# Patient Record
Sex: FEMALE | Race: WHITE | NOT HISPANIC OR LATINO | URBAN - METROPOLITAN AREA
[De-identification: names, ages, dates, MRNs, and addresses within clinical notes are randomized per-mention and may not be internally consistent; named-entity substitution may affect disease eponyms.]

---

## 2020-09-08 NOTE — PATIENT DISCUSSION
Surgery Counseling: I have discussed the option of scheduling surgery versus following, as well as the risks, benefits and alternatives of cataract surgery with the patient. It was explained that the surgery is medically indicated at this time, and it can be performed at the patient's option as delaying will cause no further deterioration, therefore there is no rush and there is no harm in waiting to have surgery. It was also explained that there is no guarantee that removing the cataract will improve their vision. The patient understands and desires to proceed with cataract surgery with the implantation of an intraocular lens to improve vision for [facility] AND [facility] AND DRIVING______. I have given the patient the prescribed regimen of the all-in-one drop to use before and after cataract surgery. They have elected to use the all-in-one option of Pred/Gati/Brom(prednisolone acetate,gatifloxacin,and bromfenac. Patient to administer as directed.

## 2020-09-08 NOTE — PATIENT DISCUSSION
CATARACTS, OU - VISUALLY SIGNIFICANT. SCHEDULE os__ FIRST THEN LATER IN _od_ DISCUSSED OPTION OF _STANDARD OS___VS _STANDARD WITH LENSX OD___. PATIENT UNDERSTANDS AND DESIRES ____STANDARD FOR OS AND WISHES TO THINK ABOUT OPTIONS FOR RIGHT EYE_.

## 2020-10-20 NOTE — PATIENT DISCUSSION
S/P PE IOL, _OS__. DOING WELL. CONTINUE PRED-GATI-BROM IN THE SURGICAL EYE  FOR A TOTAL OF 3 WEEKS USE THEN DISCONTINUE. PATIENT DESIRES __STANDARD_____IOL FOR 2ND EYE. SCHEDULE CATARACT SURGERY.

## 2020-10-20 NOTE — PATIENT DISCUSSION
Pre-Op 2nd Eye Counseling: The patient has noticed an improvement in their visual symptoms in the operative eye. The patient complains of decreased vision in the fellow eye when _____READING AND DRIVING______. It was explained to the patient that the decision to proceed with cataract surgery in the fellow eye is entirely a separate decision from the surgical eye. All of the same risks, benefits and alternatives are reviewed with the patient again. The patient does feel the vision in the non-operative eye is limiting their daily activities and elects to proceed with cataract surgery in the __RIGHT_____ eye. . I have given the patient the prescribed regimen of  drops to use before and after cataract surgery. Patient to administer as directed.

## 2020-11-09 NOTE — PATIENT DISCUSSION
Post-Op Instructions: The patient was instructed to continue the proper use of post-operative eye drop: omni in the surgical eye, 3 times per day for the remainder of the 3 weeks schedule, then discontinue. Call back instructions, retinal detachment and endophthalmitis precautions given.

## 2022-04-19 ENCOUNTER — ESTABLISHED PATIENT (OUTPATIENT)
Dept: URBAN - METROPOLITAN AREA CLINIC 7 | Facility: CLINIC | Age: 82
End: 2022-04-19

## 2022-04-19 DIAGNOSIS — H25.13: ICD-10-CM

## 2022-04-19 DIAGNOSIS — H47.233: ICD-10-CM

## 2022-04-19 PROCEDURE — 92014 COMPRE OPH EXAM EST PT 1/>: CPT

## 2022-04-19 PROCEDURE — 92250 FUNDUS PHOTOGRAPHY W/I&R: CPT

## 2022-04-19 PROCEDURE — 92015 DETERMINE REFRACTIVE STATE: CPT

## 2022-04-19 ASSESSMENT — KERATOMETRY
OD_K1POWER_DIOPTERS: 42.50
OD_AXISANGLE_DEGREES: 155
OS_K2POWER_DIOPTERS: 43.00
OD_K2POWER_DIOPTERS: 42.75
OD_AXISANGLE2_DEGREES: 65
OS_AXISANGLE_DEGREES: 108
OS_AXISANGLE2_DEGREES: 18
OS_K1POWER_DIOPTERS: 42.50

## 2022-04-19 ASSESSMENT — TONOMETRY
OD_IOP_MMHG: 17
OS_IOP_MMHG: 17

## 2022-04-19 ASSESSMENT — VISUAL ACUITY
OS_SC: 20/150
OU_SC: 20/50
OD_SC: 20/250

## 2022-05-02 ENCOUNTER — ESTABLISHED PATIENT (OUTPATIENT)
Dept: URBAN - METROPOLITAN AREA CLINIC 14 | Facility: CLINIC | Age: 82
End: 2022-05-02

## 2022-05-02 DIAGNOSIS — H25.13: ICD-10-CM

## 2022-05-02 PROCEDURE — 92136 OPHTHALMIC BIOMETRY: CPT

## 2022-05-02 PROCEDURE — 99214 OFFICE O/P EST MOD 30 MIN: CPT

## 2022-05-02 ASSESSMENT — VISUAL ACUITY
OS_PH: 20/50
OD_CC: 20/50
OS_CC: 20/50
OS_SC: 20/400
OD_SC: 20/200
OD_PH: 20/50
OS_SC: J10
OD_SC: J18
OD_CC: J10
OD_BCVA: 20/50
OS_BCVA: 20/50
OS_CC: J5

## 2022-05-02 ASSESSMENT — KERATOMETRY
OS_AXISANGLE_DEGREES: 96
OD_K1POWER_DIOPTERS: 42.25
OD_K2POWER_DIOPTERS: 42.50
OS_K2POWER_DIOPTERS: 42.75
OS_K1POWER_DIOPTERS: 42.25
OS_AXISANGLE2_DEGREES: 6
OD_AXISANGLE2_DEGREES: 36
OD_AXISANGLE_DEGREES: 126

## 2022-05-02 ASSESSMENT — TONOMETRY
OS_IOP_MMHG: 16
OD_IOP_MMHG: 13

## 2022-05-05 ENCOUNTER — CLINICAL TRIAL VISIT (OUTPATIENT)
Dept: URBAN - METROPOLITAN AREA CLINIC 14 | Facility: CLINIC | Age: 82
End: 2022-05-05

## 2022-05-05 DIAGNOSIS — H25.13: ICD-10-CM

## 2022-05-05 PROCEDURE — 99199CT CLINICAL TRIAL VISIT

## 2022-05-18 ENCOUNTER — POST OP/EVAL OF SECOND EYE (OUTPATIENT)
Dept: URBAN - METROPOLITAN AREA CLINIC 14 | Facility: CLINIC | Age: 82
End: 2022-05-18

## 2022-05-18 DIAGNOSIS — H25.11: ICD-10-CM

## 2022-05-18 DIAGNOSIS — H52.03: ICD-10-CM

## 2022-05-18 DIAGNOSIS — Z96.1: ICD-10-CM

## 2022-05-18 PROCEDURE — 99024 POSTOP FOLLOW-UP VISIT: CPT

## 2022-05-18 PROCEDURE — 92136 OPHTHALMIC BIOMETRY: CPT

## 2022-05-18 ASSESSMENT — KERATOMETRY
OS_AXISANGLE2_DEGREES: 6
OD_AXISANGLE_DEGREES: 126
OS_K1POWER_DIOPTERS: 42.25
OD_K1POWER_DIOPTERS: 42.25
OS_K2POWER_DIOPTERS: 42.75
OD_K2POWER_DIOPTERS: 42.50
OD_AXISANGLE2_DEGREES: 36
OS_AXISANGLE_DEGREES: 96

## 2022-05-18 ASSESSMENT — VISUAL ACUITY
OS_PH: 20/40
OD_BCVA: 20/50
OS_SC: 20/50

## 2022-05-18 ASSESSMENT — TONOMETRY
OS_IOP_MMHG: 21
OD_IOP_MMHG: 14

## 2022-05-25 ENCOUNTER — POST OP/EVAL OF SECOND EYE (OUTPATIENT)
Dept: URBAN - METROPOLITAN AREA CLINIC 14 | Facility: CLINIC | Age: 82
End: 2022-05-25

## 2022-05-25 PROCEDURE — 99024 POSTOP FOLLOW-UP VISIT: CPT

## 2022-05-25 ASSESSMENT — VISUAL ACUITY: OD_SC: 20/20-2

## 2022-05-25 ASSESSMENT — TONOMETRY: OD_IOP_MMHG: 11

## 2022-06-16 ENCOUNTER — CLINICAL TRIAL VISIT (OUTPATIENT)
Dept: URBAN - METROPOLITAN AREA CLINIC 14 | Facility: CLINIC | Age: 82
End: 2022-06-16

## 2022-06-16 DIAGNOSIS — Z96.1: ICD-10-CM

## 2022-06-16 PROCEDURE — 99199CT CLINICAL TRIAL VISIT

## 2022-06-22 RX ORDER — CETIRIZINE HYDROCHLORIDE 10 MG/1
TABLET ORAL
COMMUNITY

## 2022-06-22 RX ORDER — IVERMECTIN 3 MG/1
TABLET ORAL
COMMUNITY
Start: 2021-11-12 | End: 2022-10-12

## 2022-07-05 ENCOUNTER — POST-OP (OUTPATIENT)
Dept: URBAN - METROPOLITAN AREA CLINIC 9 | Facility: CLINIC | Age: 82
End: 2022-07-05

## 2022-07-05 DIAGNOSIS — H34.8110: ICD-10-CM

## 2022-07-05 DIAGNOSIS — Z98.890: ICD-10-CM

## 2022-07-05 DIAGNOSIS — H47.233: ICD-10-CM

## 2022-07-05 DIAGNOSIS — Z96.1: ICD-10-CM

## 2022-07-05 PROCEDURE — 92134 CPTRZ OPH DX IMG PST SGM RTA: CPT

## 2022-07-05 PROCEDURE — 92014 COMPRE OPH EXAM EST PT 1/>: CPT

## 2022-07-05 ASSESSMENT — TONOMETRY
OD_IOP_MMHG: 10
OS_IOP_MMHG: 14

## 2022-07-05 ASSESSMENT — VISUAL ACUITY
OS_SC: 20/30
OD_SC: CF 1FT

## 2022-07-12 ENCOUNTER — ESTABLISHED PATIENT (OUTPATIENT)
Dept: URBAN - METROPOLITAN AREA CLINIC 11 | Facility: CLINIC | Age: 82
End: 2022-07-12

## 2022-07-12 DIAGNOSIS — H35.82: ICD-10-CM

## 2022-07-12 DIAGNOSIS — H43.812: ICD-10-CM

## 2022-07-12 DIAGNOSIS — H35.362: ICD-10-CM

## 2022-07-12 PROCEDURE — 92134 CPTRZ OPH DX IMG PST SGM RTA: CPT

## 2022-07-12 PROCEDURE — 99214 OFFICE O/P EST MOD 30 MIN: CPT

## 2022-07-12 PROCEDURE — 92201 OPSCPY EXTND RTA DRAW UNI/BI: CPT

## 2022-07-12 ASSESSMENT — TONOMETRY
OD_IOP_MMHG: 10
OS_IOP_MMHG: 16

## 2022-07-12 ASSESSMENT — VISUAL ACUITY: OS_SC: 20/30

## 2022-10-12 PROBLEM — K05.30 PERIODONTITIS: Status: ACTIVE | Noted: 2022-10-12

## 2022-10-12 PROBLEM — E78.00 PURE HYPERCHOLESTEROLEMIA: Status: ACTIVE | Noted: 2022-10-12

## 2022-10-12 PROBLEM — J35.8 TONSILLITH: Status: ACTIVE | Noted: 2022-10-12

## 2022-10-12 PROBLEM — M81.0 AGE-RELATED OSTEOPOROSIS WITHOUT CURRENT PATHOLOGICAL FRACTURE: Status: ACTIVE | Noted: 2022-10-12

## 2022-10-12 PROBLEM — F43.9 STRESS: Status: ACTIVE | Noted: 2022-10-12

## 2022-10-12 PROBLEM — R35.0 FREQUENCY OF URINATION: Status: ACTIVE | Noted: 2022-10-12

## 2022-10-12 PROBLEM — R51.9 GENERALIZED HEADACHE: Status: ACTIVE | Noted: 2022-10-12

## 2022-10-12 PROBLEM — M53.9 BACK DISORDER: Status: ACTIVE | Noted: 2022-10-12

## 2022-10-25 ENCOUNTER — TECH ONLY (OUTPATIENT)
Facility: LOCATION | Age: 82
End: 2022-10-25

## 2022-10-25 DIAGNOSIS — H43.812: ICD-10-CM

## 2022-10-25 DIAGNOSIS — Z98.890: ICD-10-CM

## 2022-10-25 DIAGNOSIS — H52.02: ICD-10-CM

## 2022-10-25 DIAGNOSIS — H35.362: ICD-10-CM

## 2022-10-25 DIAGNOSIS — H47.233: ICD-10-CM

## 2022-10-25 PROCEDURE — 92015 DETERMINE REFRACTIVE STATE: CPT

## 2022-10-25 PROCEDURE — 92083 EXTENDED VISUAL FIELD XM: CPT

## 2022-10-25 PROCEDURE — 92014 COMPRE OPH EXAM EST PT 1/>: CPT

## 2022-10-25 PROCEDURE — 92133 CPTRZD OPH DX IMG PST SGM ON: CPT

## 2022-10-25 ASSESSMENT — TONOMETRY
OD_IOP_MMHG: 17
OS_IOP_MMHG: 17

## 2022-10-25 ASSESSMENT — VISUAL ACUITY
OD_SC: CF 1FT
OU_SC: 20/30-2
OS_SC: 20/30-2

## 2025-03-04 ENCOUNTER — COMPREHENSIVE EXAM (OUTPATIENT)
Age: 85
End: 2025-03-04

## 2025-03-04 DIAGNOSIS — H47.233: ICD-10-CM

## 2025-03-04 DIAGNOSIS — H35.362: ICD-10-CM

## 2025-03-04 DIAGNOSIS — H52.02: ICD-10-CM

## 2025-03-04 PROCEDURE — 92015 DETERMINE REFRACTIVE STATE: CPT

## 2025-03-04 PROCEDURE — 92014 COMPRE OPH EXAM EST PT 1/>: CPT

## 2025-03-04 PROCEDURE — 92250 FUNDUS PHOTOGRAPHY W/I&R: CPT
